# Patient Record
(demographics unavailable — no encounter records)

---

## 2025-01-17 NOTE — HISTORY OF PRESENT ILLNESS
[FreeTextEntry1] : acne [de-identified] :  Sarah Hodge 25 y/o F presents for acne.  hormonal acne, on lower face.  has been on: accutane 2x. (most recently 2019),  using: cetaphil cleanser, moisturizer from [facility]  moisturizer + SPF.    Personal history of skin cancer: no Family history of skin cancer: no History of blistering sunburns: no History of tanning bed use: no Uses sunscreen regularly: no Medication allergies: no

## 2025-01-17 NOTE — HISTORY OF PRESENT ILLNESS
[FreeTextEntry1] : acne [de-identified] :  Sarah Hodge 23 y/o F presents for acne.  hormonal acne, on lower face.  has been on: accutane 2x. (most recently 2019),  using: cetaphil cleanser, moisturizer from [facility]  moisturizer + SPF.    Personal history of skin cancer: no Family history of skin cancer: no History of blistering sunburns: no History of tanning bed use: no Uses sunscreen regularly: no Medication allergies: no